# Patient Record
Sex: MALE | Race: WHITE | HISPANIC OR LATINO | Employment: FULL TIME | URBAN - METROPOLITAN AREA
[De-identification: names, ages, dates, MRNs, and addresses within clinical notes are randomized per-mention and may not be internally consistent; named-entity substitution may affect disease eponyms.]

---

## 2023-02-03 ENCOUNTER — APPOINTMENT (OUTPATIENT)
Dept: ULTRASOUND IMAGING | Facility: HOSPITAL | Age: 38
End: 2023-02-03
Attending: EMERGENCY MEDICINE

## 2023-02-03 ENCOUNTER — HOSPITAL ENCOUNTER (EMERGENCY)
Facility: HOSPITAL | Age: 38
Discharge: HOME | End: 2023-02-03
Attending: EMERGENCY MEDICINE | Admitting: EMERGENCY MEDICINE

## 2023-02-03 ENCOUNTER — APPOINTMENT (OUTPATIENT)
Dept: RADIOLOGY | Facility: HOSPITAL | Age: 38
End: 2023-02-03
Attending: EMERGENCY MEDICINE

## 2023-02-03 VITALS
TEMPERATURE: 98.7 F | RESPIRATION RATE: 16 BRPM | OXYGEN SATURATION: 99 % | SYSTOLIC BLOOD PRESSURE: 139 MMHG | DIASTOLIC BLOOD PRESSURE: 83 MMHG | HEART RATE: 93 BPM

## 2023-02-03 DIAGNOSIS — R10.13 ABDOMINAL PAIN, EPIGASTRIC: ICD-10-CM

## 2023-02-03 DIAGNOSIS — R74.8 ELEVATED LIVER ENZYMES: ICD-10-CM

## 2023-02-03 DIAGNOSIS — K76.0 HEPATIC STEATOSIS: ICD-10-CM

## 2023-02-03 LAB
ALBUMIN SERPL BCG-MCNC: 4.7 G/DL (ref 3.5–5.2)
ALBUMIN UR-MCNC: 30 MG/DL
ALP SERPL-CCNC: 116 U/L (ref 40–129)
ALT SERPL W P-5'-P-CCNC: 159 U/L (ref 10–50)
ANION GAP SERPL CALCULATED.3IONS-SCNC: 11 MMOL/L (ref 7–15)
APPEARANCE UR: CLEAR
AST SERPL W P-5'-P-CCNC: 69 U/L (ref 10–50)
BACTERIA #/AREA URNS HPF: ABNORMAL /HPF
BASOPHILS # BLD AUTO: 0.1 10E3/UL (ref 0–0.2)
BASOPHILS NFR BLD AUTO: 1 %
BILIRUB DIRECT SERPL-MCNC: <0.2 MG/DL (ref 0–0.3)
BILIRUB SERPL-MCNC: 0.5 MG/DL
BILIRUB UR QL STRIP: NEGATIVE
BUN SERPL-MCNC: 14.1 MG/DL (ref 6–20)
CALCIUM SERPL-MCNC: 9.5 MG/DL (ref 8.6–10)
CHLORIDE SERPL-SCNC: 103 MMOL/L (ref 98–107)
COLOR UR AUTO: ABNORMAL
CREAT SERPL-MCNC: 0.53 MG/DL (ref 0.67–1.17)
DEPRECATED HCO3 PLAS-SCNC: 26 MMOL/L (ref 22–29)
EOSINOPHIL # BLD AUTO: 0 10E3/UL (ref 0–0.7)
EOSINOPHIL NFR BLD AUTO: 0 %
ERYTHROCYTE [DISTWIDTH] IN BLOOD BY AUTOMATED COUNT: 12.2 % (ref 10–15)
GFR SERPL CREATININE-BSD FRML MDRD: >90 ML/MIN/1.73M2
GLUCOSE SERPL-MCNC: 108 MG/DL (ref 70–99)
GLUCOSE UR STRIP-MCNC: NEGATIVE MG/DL
HCT VFR BLD AUTO: 47.5 % (ref 40–53)
HGB BLD-MCNC: 16.1 G/DL (ref 13.3–17.7)
HGB UR QL STRIP: NEGATIVE
IMM GRANULOCYTES # BLD: 0 10E3/UL
IMM GRANULOCYTES NFR BLD: 0 %
KETONES UR STRIP-MCNC: NEGATIVE MG/DL
LEUKOCYTE ESTERASE UR QL STRIP: NEGATIVE
LIPASE SERPL-CCNC: 23 U/L (ref 13–60)
LYMPHOCYTES # BLD AUTO: 2.8 10E3/UL (ref 0.8–5.3)
LYMPHOCYTES NFR BLD AUTO: 27 %
MCH RBC QN AUTO: 31.5 PG (ref 26.5–33)
MCHC RBC AUTO-ENTMCNC: 33.9 G/DL (ref 31.5–36.5)
MCV RBC AUTO: 93 FL (ref 78–100)
MONOCYTES # BLD AUTO: 0.6 10E3/UL (ref 0–1.3)
MONOCYTES NFR BLD AUTO: 6 %
MUCOUS THREADS #/AREA URNS LPF: PRESENT /LPF
NEUTROPHILS # BLD AUTO: 7 10E3/UL (ref 1.6–8.3)
NEUTROPHILS NFR BLD AUTO: 66 %
NITRATE UR QL: NEGATIVE
NRBC # BLD AUTO: 0 10E3/UL
NRBC BLD AUTO-RTO: 0 /100
PH UR STRIP: 7.5 [PH] (ref 5–7)
PLATELET # BLD AUTO: 320 10E3/UL (ref 150–450)
POTASSIUM SERPL-SCNC: 4 MMOL/L (ref 3.4–5.3)
PROT SERPL-MCNC: 8.6 G/DL (ref 6.4–8.3)
RBC # BLD AUTO: 5.11 10E6/UL (ref 4.4–5.9)
RBC URINE: 1 /HPF
SODIUM SERPL-SCNC: 140 MMOL/L (ref 136–145)
SP GR UR STRIP: 1.02 (ref 1–1.03)
SQUAMOUS EPITHELIAL: <1 /HPF
TRANSITIONAL EPI: <1 /HPF
TROPONIN T SERPL HS-MCNC: <6 NG/L
UROBILINOGEN UR STRIP-MCNC: <2 MG/DL
WBC # BLD AUTO: 10.5 10E3/UL (ref 4–11)
WBC URINE: 9 /HPF

## 2023-02-03 PROCEDURE — 250N000013 HC RX MED GY IP 250 OP 250 PS 637: Performed by: EMERGENCY MEDICINE

## 2023-02-03 PROCEDURE — 80053 COMPREHEN METABOLIC PANEL: CPT | Performed by: EMERGENCY MEDICINE

## 2023-02-03 PROCEDURE — 82248 BILIRUBIN DIRECT: CPT | Performed by: EMERGENCY MEDICINE

## 2023-02-03 PROCEDURE — 250N000009 HC RX 250: Performed by: EMERGENCY MEDICINE

## 2023-02-03 PROCEDURE — 71046 X-RAY EXAM CHEST 2 VIEWS: CPT

## 2023-02-03 PROCEDURE — 81001 URINALYSIS AUTO W/SCOPE: CPT | Performed by: EMERGENCY MEDICINE

## 2023-02-03 PROCEDURE — 93005 ELECTROCARDIOGRAM TRACING: CPT | Performed by: EMERGENCY MEDICINE

## 2023-02-03 PROCEDURE — 99285 EMERGENCY DEPT VISIT HI MDM: CPT | Mod: 25

## 2023-02-03 PROCEDURE — 83690 ASSAY OF LIPASE: CPT | Performed by: EMERGENCY MEDICINE

## 2023-02-03 PROCEDURE — 85004 AUTOMATED DIFF WBC COUNT: CPT | Performed by: EMERGENCY MEDICINE

## 2023-02-03 PROCEDURE — 87086 URINE CULTURE/COLONY COUNT: CPT | Performed by: EMERGENCY MEDICINE

## 2023-02-03 PROCEDURE — 36415 COLL VENOUS BLD VENIPUNCTURE: CPT | Performed by: EMERGENCY MEDICINE

## 2023-02-03 PROCEDURE — 76705 ECHO EXAM OF ABDOMEN: CPT

## 2023-02-03 PROCEDURE — 84484 ASSAY OF TROPONIN QUANT: CPT | Performed by: EMERGENCY MEDICINE

## 2023-02-03 RX ORDER — OMEPRAZOLE 40 MG/1
40 CAPSULE, DELAYED RELEASE ORAL DAILY
Qty: 21 CAPSULE | Refills: 0 | Status: SHIPPED | OUTPATIENT
Start: 2023-02-03 | End: 2023-02-24

## 2023-02-03 RX ADMIN — LIDOCAINE HYDROCHLORIDE 30 ML: 20 SOLUTION ORAL; TOPICAL at 17:59

## 2023-02-03 ASSESSMENT — ACTIVITIES OF DAILY LIVING (ADL)
ADLS_ACUITY_SCORE: 35
ADLS_ACUITY_SCORE: 35

## 2023-02-03 NOTE — ED TRIAGE NOTES
Patient presents here with generalized abdominal pain that has occurred over the past week. No nausea, vomiting or diarrhea noted. He reports that the pain increases and decreased in intensity. At times, it is completely resolved. He notes that the pain often occurs when he is at work.

## 2023-02-03 NOTE — ED PROVIDER NOTES
Emergency Department Encounter     Evaluation Date & Time:   No admission date for patient encounter.    CHIEF COMPLAINT:  Abdominal Pain      Triage Note:Patient presents here with generalized abdominal pain that has occurred over the past week. No nausea, vomiting or diarrhea noted. He reports that the pain increases and decreased in intensity. At times, it is completely resolved. He notes that the pain often occurs when he is at work.            Impression and Plan       FINAL IMPRESSION:    ICD-10-CM    1. Abdominal pain, epigastric  R10.13 Adult GI  Referral - Consult Only      2. Elevated liver enzymes  R74.8 Adult GI  Referral - Consult Only      3. Hepatic steatosis  K76.0             ED COURSE & MEDICAL DECISION MAKIN:06 PM I met with the patient, obtained history, performed an initial exam, and discussed options and plan for diagnostics and treatment here in the ED. PPE worn including surgical mask, surgical gloves, surgical gown.  8:50 PM I reevaluated and updated the patient using the professional  service.    38 year old male, otherwise healthy, who presents for evaluation of burning epigastric abdominal pain that has been intermittent for the past week. He had previously been taking an unknown medication for this that would temporarily relieve his pain, but he has since stopped taking this medication. No associated N/V/D, urinary symptoms, fevers.     He reports occasional left-sided chest pressure associated with the abdominal pain. Denies shortness of breath.     On exam, he has RRR with clear breath sounds. Abdomen is soft with mild tenderness to palpation epigastrium; no peritoneal signs.    IV access established and blood sent for labs.    Considered cardiac etiology for which EKG was performed and demonstrated NSR with no ischemic changes.  Troponin negative (<6); a single, normal troponin is reassuring that symptoms are not secondary to ACS given that they  have been intermittent for ~1 week and I do not think serial troponin testing is indicated.    CXR performed and was negative.    Labs otherwise remarkable for no leukocytosis, anemia, electrolyte derangements or renal impairment.    Liver enzymes mildly elevated (AST 69, ) without laboratory evidence of biliary obstruction or pancreatitis.  RUQ ultrasound performed and demonstrated hepatic steatosis and was otherwise unremarkable.    Patient's pain significantly improved after GI cocktail.  I suspect gastritis vs PUD.    Abdomen is benign and I do not suspect perforated viscus, atypical appendicitis, colitis, diverticulitis, intra-abdominal abscess, aortic dissection or other surgical emergency and risks of CT imaging felt to outweigh benefit.    Patient discharged to home with follow-up with his primary care provider next week.  I also recommended GI follow-up for possible EGD and a referral was ordered.  Patient given a prescription for omeprazole.  Return precautions provided.  Patient stable throughout ED course.      At the conclusion of the encounter I discussed the results of all the tests and the disposition. The questions were answered. The patient acknowledged understanding and was agreeable with the care plan.    Medical Decision Making    History:    Supplemental history from: Documented in chart, if applicable    External Record(s) reviewed: Documented in chart, if applicable.    Work Up:    Chart documentation includes differential considered and any EKGs or imaging independently interpreted by provider, where specified.    In additional to work up documented, I considered the following work up: Documented in chart, if applicable. Considered CT abdomen / pelvis, however risks felt to outweigh benefit    Complicating factors:    Care impacted by chronic illness: N/A    Care affected by social determinants of health: N/A    Disposition considerations: Discharge. I prescribed additional  "prescription strength medication(s) as charted. I considered admission, but discharged the patient after share decision making conversation.      MEDICATIONS GIVEN IN THE EMERGENCY DEPARTMENT:  Medications   lidocaine (viscous) (XYLOCAINE) 2 % 15 mL, alum & mag hydroxide-simethicone (MAALOX) 15 mL GI Cocktail (30 mLs Oral Given 2/3/23 1759)       NEW PRESCRIPTIONS STARTED AT TODAY'S ED VISIT:  New Prescriptions    OMEPRAZOLE (PRILOSEC) 40 MG DR CAPSULE    Take 1 capsule (40 mg) by mouth daily for 21 days       HPI     History obtained from: Patient    Interpter: Yes (language line) - Upper sorbian      Dima Atkinson is a 38 year old male, otherwise healthy, who presents to this ED by walk in for evaluation of abdominal pain. The pain started one week ago and has been intermittent since onset. The pain is located in the epigastrium without radiation into his back. The pain is burning in nature and worse if he sits for too long. He had previously been taking an unknown medication for this that would temporarily relieve his pain, but he has since stopped taking this medication. He denies associated N/V/D, urinary symptoms, fevers.     He does report occasional left-sided chest pressure associated with the abdominal pain. Denies shortness of breath.     He also reports some occasional \"shaking\" as if \"I am low on certain vitamins\".     No history of abdominal surgeries.     REVIEW OF SYSTEMS:  All other systems reviewed and are negative.      Medical History     History reviewed. No pertinent past medical history.    History reviewed. No pertinent surgical history.    History reviewed. No pertinent family history.         omeprazole (PRILOSEC) 40 MG DR capsule        Physical Exam     First Vitals:  Patient Vitals for the past 24 hrs:   BP Temp Temp src Pulse Resp SpO2   02/03/23 1930 139/83 -- -- -- -- --   02/03/23 1815 (!) 142/78 -- -- -- -- --   02/03/23 1509 (!) 167/97 98.7  F (37.1  C) Oral 93 16 99 % "   02/03/23 1502 -- -- Oral -- -- --       PHYSICAL EXAM:   Physical Exam    GENERAL: Awake, alert.  In no acute distress.   HEENT: Normocephalic, atraumatic. Pupils equal, round and reactive. Conjunctiva normal.   NECK: No stridor.  PULMONARY: Symmetrical breath sounds without distress.  Lungs clear to auscultation bilaterally without wheezes, rhonchi or rales.  CARDIO: Regular rate and rhythm.  No significant murmur, rub or gallop.  Radial pulses strong and symmetrical.  ABDOMINAL: Abdomen soft, non-distended with mild tenderness to palpation epigastrium; no rebound tenderness or guarding.  No CVAT, BL.  EXTREMITIES: No lower extremity swelling or edema.      NEURO: Alert and oriented to person, place and time.  Cranial nerves grossly intact.  No focal motor deficit.  PSYCH: Normal mood and affect.  SKIN: No rashes.     Results     LAB:  All pertinent labs reviewed and interpreted  Labs Ordered and Resulted from Time of ED Arrival to Time of ED Departure   BASIC METABOLIC PANEL - Abnormal       Result Value    Sodium 140      Potassium 4.0      Chloride 103      Carbon Dioxide (CO2) 26      Anion Gap 11      Urea Nitrogen 14.1      Creatinine 0.53 (*)     Calcium 9.5      Glucose 108 (*)     GFR Estimate >90     HEPATIC FUNCTION PANEL - Abnormal    Protein Total 8.6 (*)     Albumin 4.7      Bilirubin Total 0.5      Alkaline Phosphatase 116      AST 69 (*)      (*)     Bilirubin Direct <0.20     ROUTINE UA WITH MICROSCOPIC REFLEX TO CULTURE - Abnormal    Color Urine Light Yellow      Appearance Urine Clear      Glucose Urine Negative      Bilirubin Urine Negative      Ketones Urine Negative      Specific Gravity Urine 1.024      Blood Urine Negative      pH Urine 7.5 (*)     Protein Albumin Urine 30 (*)     Urobilinogen Urine <2.0      Nitrite Urine Negative      Leukocyte Esterase Urine Negative      Bacteria Urine Few (*)     Mucus Urine Present (*)     RBC Urine 1      WBC Urine 9 (*)     Squamous  Epithelials Urine <1      Transitional Epithelials Urine <1     LIPASE - Normal    Lipase 23     TROPONIN T, HIGH SENSITIVITY - Normal    Troponin T, High Sensitivity <6     CBC WITH PLATELETS AND DIFFERENTIAL    WBC Count 10.5      RBC Count 5.11      Hemoglobin 16.1      Hematocrit 47.5      MCV 93      MCH 31.5      MCHC 33.9      RDW 12.2      Platelet Count 320      % Neutrophils 66      % Lymphocytes 27      % Monocytes 6      % Eosinophils 0      % Basophils 1      % Immature Granulocytes 0      NRBCs per 100 WBC 0      Absolute Neutrophils 7.0      Absolute Lymphocytes 2.8      Absolute Monocytes 0.6      Absolute Eosinophils 0.0      Absolute Basophils 0.1      Absolute Immature Granulocytes 0.0      Absolute NRBCs 0.0         RADIOLOGY:  Abdomen US, limited (RUQ only)   Final Result   IMPRESSION:   1.  Hepatic steatosis.   2.  Otherwise unremarkable right upper quadrant ultrasound. No biliary dilatation.            Chest XR,  PA & LAT   Final Result   IMPRESSION: Negative chest.        EC/3/2023, 16:40; NSR with rate of 77 bpm; normal intervals; normal conduction; no ST-T wave changes consistent with ACS or pericarditis; no previous EKG available for comparison    EKG independently reviewed and interpreted by Cindy Coy MD      I, Nolberto Tolliver, am serving as a scribe to document services personally performed by Cindy Coy MD based on my observation and the provider's statements to me. I, Cindy Coy MD attest that Nolberto Tolliver is acting in a scribe capacity, has observed my performance of the services and has documented them in accordance with my direction.    Cindy Coy MD  Emergency Medicine  [facility] EMERGENCY DEPARTMENT         Cindy Coy MD  23 [id number]

## 2023-02-04 NOTE — DISCHARGE INSTRUCTIONS
Please follow-up with your Primary Care Provider / Clinic this upcoming week for a recheck; call to arrange appointment.     Please have your doctor recheck your liver enzymes as they were elevated today.     I recommend a consultation with a gastroenterologist for possible upper endoscopy (EGD) for further evaluation of your pain. I placed a referral to the gastroenterologist - they should call you to arrange appointment.     Return to the ER for worsening symptoms, worsening abdominal pain, persistent nausea / vomiting, fever or other concerns.

## 2023-02-05 LAB — BACTERIA UR CULT: NO GROWTH

## 2023-02-08 NOTE — TELEPHONE ENCOUNTER
REFERRAL INFORMATION:    Referring Provider:  Cindy Coy    Referring Clinic:  [facility] - ED    Reason for Visit/Diagnosis: abdominal pain epigastric, elevated enzymes     FUTURE VISIT INFORMATION:    Appointment Date: 05-    Appointment Time: 1 pm     NOTES STATUS DETAILS   OFFICE NOTE from Referring Provider Internal Cindy Coy  02-   OFFICE NOTE from Other Specialist Internal Abdominal pain epigastric  Elevated liver enzymes   HOSPITAL DISCHARGE SUMMARY/  ED VISITS Internal 02-   OPERATIVE REPORT N/A    MEDICATION LIST Internal         ENDOSCOPY  N/A    COLONOSCOPY N/A    ERCP N/A    EUS N/A    STOOL TESTING N/A    PERTINENT LABS N/A    PATHOLOGY REPORTS (RELATED) Internal    IMAGING (CT, MRI, EGD, MRCP, Small Bowel Follow Through/SBT, MR/CT Enterography) Internal (US) 02-  (XR Chest) 02-

## 2023-02-08 NOTE — TELEPHONE ENCOUNTER
DIAGNOSIS: Abdominal pain, epigastric [R10.13] Elevated liver enzymes    Appt Date: 03.30.2023    NOTES STATUS DETAILS   OFFICE NOTE from referring provider Internal 02.03.2023 Cindy Coy MD   OFFICE NOTES from other specialists     DISCHARGE SUMMARY from hospital Internal 02.03.2023 Cindy Coy MD   MEDICATION LIST Internal    LIVER BIOSPY (IF APPLICABLE)      PATHOLOGY REPORTS      IMAGING     ENDOSCOPY (IF AVAILABLE)     COLONOSCOPY (IF AVAILABLE)     ULTRASOUND LIVER Internal 02.03.2023 US ABDOMEN LIMITED   CT OF ABDOMEN     MRI OF LIVER     FIBROSCAN, US ELASTOGRAPHY, FIBROSIS SCAN, MR ELASTOGRAPHY     LABS     HEPATIC PANEL (LIVER PANEL) Internal 02.03.2023   BASIC METABOLIC PANEL Internal 02.03.2023   COMPLETE METABOLIC PANEL Internal 02.03.2023   COMPLETE BLOOD COUNT (CBC)     INTERNATIONAL NORMALIZED RATIO (INR)     HEPATITIS C ANTIBODY     HEPATITIS C VIRAL LOAD/PCR     HEPATITIS C GENOTYPE     HEPATITIS B SURFACE ANTIGEN     HEPATITIS B SURFACE ANTIBODY     HEPATITIS B DNA QUANT LEVEL     HEPATITIS B CORE ANTIBODY

## 2023-02-10 ENCOUNTER — TELEPHONE (OUTPATIENT)
Dept: GASTROENTEROLOGY | Facility: CLINIC | Age: 38
End: 2023-02-10

## 2023-02-10 NOTE — TELEPHONE ENCOUNTER
Along with , Writer called Pt to get them scheduled with a sooner appointment. Did not reach Pt and was unable to leave a message.

## 2023-02-20 NOTE — TELEPHONE ENCOUNTER
Called and spoke with Pt. Pt is now rescheduled to see Debbie Slade on 3/9/2023 at 7am for an in-person clinic appointment.

## 2023-03-01 ENCOUNTER — TELEPHONE (OUTPATIENT)
Dept: GASTROENTEROLOGY | Facility: CLINIC | Age: 38
End: 2023-03-01

## 2023-03-01 NOTE — TELEPHONE ENCOUNTER
Called via  line to remind patient of their upcoming appointment with our GI clinic, on Thursday 3/9/2023 at 6:45AM with Debbie Slade. This appointment is scheduled as an in-person appt. Please arrive 15 minutes early to check in for your appointment. , if your appointment is virtual (video or telephone) you need to be in [place] for the visit. To reschedule or cancel patient to call [phone].    Madiha Marshall MA

## 2023-03-09 ENCOUNTER — OFFICE VISIT (OUTPATIENT)
Dept: GASTROENTEROLOGY | Facility: CLINIC | Age: 38
End: 2023-03-09
Attending: EMERGENCY MEDICINE

## 2023-03-09 VITALS — OXYGEN SATURATION: 96 % | DIASTOLIC BLOOD PRESSURE: 82 MMHG | SYSTOLIC BLOOD PRESSURE: 138 MMHG | HEART RATE: 72 BPM

## 2023-03-09 DIAGNOSIS — R74.8 ELEVATED LIVER ENZYMES: ICD-10-CM

## 2023-03-09 DIAGNOSIS — K21.9 GASTROESOPHAGEAL REFLUX DISEASE, UNSPECIFIED WHETHER ESOPHAGITIS PRESENT: Primary | ICD-10-CM

## 2023-03-09 DIAGNOSIS — R10.13 ABDOMINAL PAIN, EPIGASTRIC: ICD-10-CM

## 2023-03-09 PROCEDURE — 99204 OFFICE O/P NEW MOD 45 MIN: CPT | Performed by: DIETITIAN, REGISTERED

## 2023-03-09 RX ORDER — FAMOTIDINE 20 MG/1
20 TABLET, FILM COATED ORAL 2 TIMES DAILY PRN
Qty: 60 TABLET | Refills: 3 | Status: SHIPPED | OUTPATIENT
Start: 2023-03-09

## 2023-03-09 ASSESSMENT — PAIN SCALES - GENERAL: PAINLEVEL: EXTREME PAIN (8)

## 2023-03-09 NOTE — PATIENT INSTRUCTIONS
It was a pleasure taking care of you today.  I've included a brief summary of our discussion and care plan from today's visit below.  Please review this information with your primary care provider.  ______________________________________________________________________    My recommendations are summarized as follows:  -- Submit stool sample for h.pylori  -- Start famotidine (Pepcid) as needed up to twice per day  -- Take TUMS as needed  Lifestyle modifications for gastroesophageal reflux disease (GERD).     1. Change your eating habits.  -- It's best to eat several small meals instead of two or three large meals.  -- After you eat, wait 2 to 3 hours before you lie down. Late-night snacks aren't a good idea.  -- Chocolate, mint, and alcohol can make GERD worse. They relax the valve between the esophagus and the stomach.  -- Spicy foods, fatty foods, foods that have a lot of acid (like tomatoes and oranges), and coffee can make GERD symptoms worse in some people. If your symptoms are worse after you eat a certain food, you may want to stop eating that food to see if your symptoms get better.    2. Do not smoke or chew tobacco.    3. If you have GERD symptoms at night, raise the head of your bed 6 in. (15 cm) to 8 in. (20 cm) by putting the frame on blocks or placing a foam wedge under the head of your mattress. (Adding extra pillows does not work.)    4. Avoid or reduce pressure on your stomach. Don't wear tight clothing around your middle.    5. Lose weight if you need to. Losing just 5 to 10 pounds can help.    -- please see scheduling information provided below     Return to GI Clinic in 3 months to review your progress.    ______________________________________________________________________    How do I schedule labs, imaging studies, or procedures that were ordered in clinic today?     Labs: To schedule lab appointment at the [facility], use my chart or call [phone]. If you have a [facility]  lab closer to home where you are regularly seen you can give them a call.     Procedures: If a colonoscopy, upper endoscopy, breath test, esophageal manometry, or pH impedence was ordered today, our endoscopy team will call you to schedule this. If you have not heard from our endoscopy team within a week, please call [phone] to schedule.     Imaging Studies: If you were scheduled for a CT scan, X-ray, MRI, ultrasound, HIDA scan or other imaging study, please call [phone] to have this scheduled.     Referral: If a referral to another specialty was ordered, expect a phone call or follow instructions above. If you have not heard from anyone regarding your referral in a week, please call our clinic to check the status.     Who do I call with any questions after my visit?  Please be in touch if there are any further questions that arise following today's visit.  There are multiple ways to contact your gastroenterology care team.      During business hours, you may reach a Gastroenterology nurse at [phone]    To schedule or reschedule an appointment, please call [phone].     You can always send a secure message through Repunch.  Repunch messages are answered by your nurse or doctor typically within 24 hours.  Please allow extra time on weekends and holidays.      For urgent/emergent questions after business hours, you may reach the on-call GI Fellow by contacting the [facility]  at [phone].     How will I get the results of any tests ordered?    You will receive all of your results.  If you have signed up for [employer]unch, any tests ordered at your visit will be available to you after your provider reviews them.  Typically this takes 1-2 weeks.  If there are urgent results that require a change in your care plan, your provider or nurse will call you to discuss the next steps.      What is Repunch?  Repunch is a secure way for you to access all of your healthcare records from the  [facility].  It is a web based computer program, so you can sign on to it from any location.  It also allows you to send secure messages to your care team.  I recommend signing up for Easy[employer] access if you have not already done so and are comfortable with using a computer.      How to I schedule a follow-up visit?  If you did not schedule a follow-up visit today, please call [phone] to schedule a follow-up office visit.      Sincerely,    Debbie Slade PA-C  Division of Gastroenterology, Hepatology & Nutrition  [facility]

## 2023-03-09 NOTE — PROGRESS NOTES
GI CLINIC VISIT    CC/REFERRING MD:  Cindy Coy  REASON FOR CONSULTATION: epigastric pain    ASSESSMENT/PLAN:  #GERD  #Epigastric Pain  Suspect his symptoms are related to underlying GERD.  Other differential includes gastritis/esophagitis/PUD, functional dyspepsia, gastroparesis.  He has had improvement in symptoms over the last several weeks since ED visit with course of omeprazole.  No alarm features at this time.  Given that he is no longer taking omeprazole we will pursue H. pylori stool antigen testing.  After this is submitted we can complete another course of omeprazole if symptoms are not controlled with famotidine.  In the meantime he can take famotidine up to twice daily and Tums as needed.  Additionally discussed lifestyle modifications such as elevating head of bed, avoiding large meals, avoiding eating late at night, avoiding tight fitting clothing, ongoing avoidance of alcohol, and other dietary triggers.  If symptoms worsen or not controlled with omeprazole, recommend EGD for further evaluation.  Other future considerations include gastric emptying study.    Plan:  -- Submit stool sample for h.pylori  -- Start famotidine (Pepcid) as needed up to twice per day  -- Take TUMS as needed  -- After stool sample, we can prescribe another course of omeprazole if famotidine and TUMS are not controlling symptoms  -- Lifestyle modifications for gastroesophageal reflux disease (GERD).      Colorectal cancer screening: Colorectal cancer screening: Patient has no personal or family history of colorectal cancer.  Recommend starting surveillance screening at the age of 45 unless symptomatic sooner.      RTC 3 months    Thank you for this consultation.  It was a pleasure to participate in the care of this patient; please contact us with any further questions.     53 Minutes was spent on the date of the encounter during chart review, history and exam, documentation, and further activities as noted        Debbie Slade PA-C, RD  Division of Gastroenterology, Hepatology & Nutrition  [facility]        HPI  Dima Atkinson is a 38 year old male with no significant past medical history who presents for evaluation of epigastric pain. They are new to the [facility] GI clinic and this is my first encounter with the patient.     He was seen in the ED 2/3/23 for one week of epigastric burning pain. Labs notable for slightly elevated LFTs,  (AST 69, ) otherwise normal including lipase. RUQ ultrasound performed and demonstrated hepatic steatosis and was otherwise unremarkable. He also reported occasional left-sided chest pressure associated with the abdominal pain.  EKG and troponin were unremarkable. He was given GI cocktail with relief and referred to luminal GI for possible EGD and hepatology.     Today Mr. Robert Atkinson reports that epigastric pain is actually quite improved since ED visit.  He completed a 21-day course of omeprazole after.  Not currently taking any medications.    Does continue to have intermittent epigastric and substernal burning with eating.  Will occasionally experience acid brash and taste of acid in mouth when more severe, this has been less frequent recently.  He denies regurgitation, cough, throat clearing, globus sensation.  Notes that spicy food and Coca-Cola make it worse.  Denies dysphagia or odynophagia.    He does note that he sometimes feels full quickly.  Occasionally experiences nausea in the morning after waking.  No vomiting. No bloating.  No diarrhea or constipation. No change in bowel pattern - 4 stools per day (BSC 3-4). No straining. Feels empty after. No hematochezia or melena.     No personal or family history of h.pylori. No PUD. No personal or family history of esophageal or stomach cancer.     No significant NSAID use. Was drinking ~5 drinks per day up until time of sx onset, stopped after which helped with sx. No tobacco use.      ROS:     No fevers or chills  No weight loss  No blurry vision, double vision or change in vision  No sore throat  No lymphadenopathy  No headache, paraesthesias, or weakness in a limb  No shortness of breath or wheezing  No chest pain or pressure  No arthralgias or myalgias  No rashes or skin changes  No odynophagia or dysphagia  No BRBPR, hematochezia, melena  No dysuria, frequency or urgency  No hot/cold intolerance or polyria  No anxiety or depression      PROBLEM LIST  There are no problems to display for this patient.      PERTINENT PAST MEDICAL HISTORY:  No past medical history on file.    PREVIOUS SURGERIES:  No past surgical history on file.    PREVIOUS ENDOSCOPY:  no    ALLERGIES:   No Known Allergies    PERTINENT MEDICATIONS:  No current outpatient medications on file.  No other NSAID/anticoagulation reported by patient.  No other OTC/herbal/supplements reported by patient.  Vitamins for stress - for being tired    SOCIAL HISTORY:  Tobacco: no  Alcohol: no, was drinking 5 drinks per day, quit when pain started. Sometimes made pain worse.  Drugs Use: no    Social History     Socioeconomic History     Marital status: Single     Spouse name: Not on file     Number of children: Not on file     Years of education: Not on file     Highest education level: Not on file   Occupational History     Not on file   Tobacco Use     Smoking status: Not on file     Smokeless tobacco: Not on file   Substance and Sexual Activity     Alcohol use: Not on file     Drug use: Not on file     Sexual activity: Not on file   Other Topics Concern     Not on file   Social History Narrative     Not on file     Social Determinants of Health     Financial Resource Strain: Not on file   Food Insecurity: Not on file   Transportation Needs: Not on file   Physical Activity: Not on file   Stress: Not on file   Social Connections: Not on file   Intimate Partner Violence: Not on file   Housing Stability: Not on file       FAMILY HISTORY:  FH of CRC:  no  FH of IBD: no  FH of esophageal cancer: no  FH of stomach cancer: no    No family history on file.    Past/family/social history reviewed and no changes    PHYSICAL EXAMINATION:  Constitutional: AAOx3, cooperative, pleasant, not dyspneic/diaphoretic, no acute distress  Vitals reviewed: There were no vitals taken for this visit.  Wt:   Wt Readings from Last 2 Encounters:   No data found for Wt      Eyes: Sclera anicteric/injected  Ears/nose/mouth/throat: Normal oropharynx without ulcers or exudate, mucus membranes moist, hearing intact  Neck: supple, thyroid normal size  CV: No edema  Respiratory: Unlabored breathing  Lymph: No axillary, submandibular, supraclavicular or inguinal lymphadenopathy  Abd: Nondistended, +bs, no hepatosplenomegaly, nontender, no peritoneal signs  Skin: warm, perfused, no jaundice  Psych: Normal affect  MSK: Normal gait      PERTINENT STUDIES:  No results found for any previous visit.     Abdominal US 2/3/23  IMPRESSION:  1.  Hepatic steatosis.  2.  Otherwise unremarkable right upper quadrant ultrasound. No biliary dilatation.

## 2023-03-09 NOTE — NURSING NOTE
Chief Complaint   Patient presents with     New Patient       Vitals:    03/09/23 0709   BP: 138/82   Pulse: 72   SpO2: 96%   Weight: (P) 90.7 kg (200 lb)       There is no height or weight on file to calculate BMI.    [employer]

## 2023-03-09 NOTE — LETTER
Date:April 3, 2023      Provider requested that no letter be sent. Do not send.       [facility]

## 2023-03-30 ENCOUNTER — PRE VISIT (OUTPATIENT)
Dept: GASTROENTEROLOGY | Facility: CLINIC | Age: 38
End: 2023-03-30

## 2023-03-30 ENCOUNTER — PRE VISIT (OUTPATIENT)
Dept: NEPHROLOGY | Facility: CLINIC | Age: 38
End: 2023-03-30

## 2023-04-26 ENCOUNTER — APPOINTMENT (OUTPATIENT)
Dept: INTERPRETER SERVICES | Facility: CLINIC | Age: 38
End: 2023-04-26

## 2023-04-26 ENCOUNTER — TELEPHONE (OUTPATIENT)
Dept: GASTROENTEROLOGY | Facility: CLINIC | Age: 38
End: 2023-04-26

## 2023-05-23 ENCOUNTER — PRE VISIT (OUTPATIENT)
Dept: GASTROENTEROLOGY | Facility: CLINIC | Age: 38
End: 2023-05-23

## 2025-04-18 ENCOUNTER — HOSPITAL ENCOUNTER (EMERGENCY)
Facility: HOSPITAL | Age: 40
Discharge: HOME | End: 2025-04-18
Attending: EMERGENCY MEDICINE | Admitting: EMERGENCY MEDICINE

## 2025-04-18 ENCOUNTER — APPOINTMENT (OUTPATIENT)
Dept: RADIOLOGY | Facility: HOSPITAL | Age: 40
End: 2025-04-18
Attending: EMERGENCY MEDICINE

## 2025-04-18 VITALS
RESPIRATION RATE: 18 BRPM | TEMPERATURE: 97.2 F | SYSTOLIC BLOOD PRESSURE: 145 MMHG | DIASTOLIC BLOOD PRESSURE: 91 MMHG | HEART RATE: 75 BPM | OXYGEN SATURATION: 98 % | WEIGHT: 200 LBS

## 2025-04-18 DIAGNOSIS — R07.9 CHEST PAIN, UNSPECIFIED TYPE: ICD-10-CM

## 2025-04-18 DIAGNOSIS — Z86.59 HISTORY OF ANXIETY: ICD-10-CM

## 2025-04-18 LAB
ANION GAP SERPL CALCULATED.3IONS-SCNC: 11 MMOL/L (ref 7–15)
BASOPHILS # BLD AUTO: 0 10E3/UL (ref 0–0.2)
BASOPHILS NFR BLD AUTO: 1 %
BUN SERPL-MCNC: 18.5 MG/DL (ref 6–20)
CALCIUM SERPL-MCNC: 9.6 MG/DL (ref 8.8–10.4)
CHLORIDE SERPL-SCNC: 103 MMOL/L (ref 98–107)
CREAT SERPL-MCNC: 0.63 MG/DL (ref 0.67–1.17)
D DIMER PPP FEU-MCNC: <0.27 UG/ML FEU (ref 0–0.5)
EGFRCR SERPLBLD CKD-EPI 2021: >90 ML/MIN/1.73M2
EOSINOPHIL # BLD AUTO: 0.1 10E3/UL (ref 0–0.7)
EOSINOPHIL NFR BLD AUTO: 1 %
ERYTHROCYTE [DISTWIDTH] IN BLOOD BY AUTOMATED COUNT: 12.4 % (ref 10–15)
GLUCOSE SERPL-MCNC: 102 MG/DL (ref 70–99)
HCO3 SERPL-SCNC: 25 MMOL/L (ref 22–29)
HCT VFR BLD AUTO: 41.3 % (ref 40–53)
HGB BLD-MCNC: 14.5 G/DL (ref 13.3–17.7)
HOLD SPECIMEN: NORMAL
HOLD SPECIMEN: NORMAL
IMM GRANULOCYTES # BLD: 0 10E3/UL
IMM GRANULOCYTES NFR BLD: 0 %
LYMPHOCYTES # BLD AUTO: 2.5 10E3/UL (ref 0.8–5.3)
LYMPHOCYTES NFR BLD AUTO: 30 %
MCH RBC QN AUTO: 30.7 PG (ref 26.5–33)
MCHC RBC AUTO-ENTMCNC: 35.1 G/DL (ref 31.5–36.5)
MCV RBC AUTO: 87 FL (ref 78–100)
MONOCYTES # BLD AUTO: 0.5 10E3/UL (ref 0–1.3)
MONOCYTES NFR BLD AUTO: 6 %
NEUTROPHILS # BLD AUTO: 5.1 10E3/UL (ref 1.6–8.3)
NEUTROPHILS NFR BLD AUTO: 61 %
NRBC # BLD AUTO: 0 10E3/UL
NRBC BLD AUTO-RTO: 0 /100
PLATELET # BLD AUTO: 262 10E3/UL (ref 150–450)
POTASSIUM SERPL-SCNC: 4 MMOL/L (ref 3.4–5.3)
RBC # BLD AUTO: 4.73 10E6/UL (ref 4.4–5.9)
SODIUM SERPL-SCNC: 139 MMOL/L (ref 135–145)
TROPONIN T SERPL HS-MCNC: <6 NG/L
WBC # BLD AUTO: 8.3 10E3/UL (ref 4–11)

## 2025-04-18 PROCEDURE — 84484 ASSAY OF TROPONIN QUANT: CPT | Performed by: STUDENT IN AN ORGANIZED HEALTH CARE EDUCATION/TRAINING PROGRAM

## 2025-04-18 PROCEDURE — 85379 FIBRIN DEGRADATION QUANT: CPT | Performed by: EMERGENCY MEDICINE

## 2025-04-18 PROCEDURE — 250N000013 HC RX MED GY IP 250 OP 250 PS 637: Performed by: EMERGENCY MEDICINE

## 2025-04-18 PROCEDURE — 85004 AUTOMATED DIFF WBC COUNT: CPT | Performed by: STUDENT IN AN ORGANIZED HEALTH CARE EDUCATION/TRAINING PROGRAM

## 2025-04-18 PROCEDURE — 82310 ASSAY OF CALCIUM: CPT | Performed by: STUDENT IN AN ORGANIZED HEALTH CARE EDUCATION/TRAINING PROGRAM

## 2025-04-18 PROCEDURE — 71046 X-RAY EXAM CHEST 2 VIEWS: CPT

## 2025-04-18 PROCEDURE — 99285 EMERGENCY DEPT VISIT HI MDM: CPT | Mod: 25 | Performed by: EMERGENCY MEDICINE

## 2025-04-18 PROCEDURE — 93005 ELECTROCARDIOGRAM TRACING: CPT | Performed by: EMERGENCY MEDICINE

## 2025-04-18 PROCEDURE — 250N000011 HC RX IP 250 OP 636: Mod: JZ | Performed by: EMERGENCY MEDICINE

## 2025-04-18 PROCEDURE — 96374 THER/PROPH/DIAG INJ IV PUSH: CPT | Performed by: EMERGENCY MEDICINE

## 2025-04-18 PROCEDURE — 80048 BASIC METABOLIC PNL TOTAL CA: CPT | Performed by: STUDENT IN AN ORGANIZED HEALTH CARE EDUCATION/TRAINING PROGRAM

## 2025-04-18 PROCEDURE — 36415 COLL VENOUS BLD VENIPUNCTURE: CPT | Performed by: STUDENT IN AN ORGANIZED HEALTH CARE EDUCATION/TRAINING PROGRAM

## 2025-04-18 RX ORDER — HYDROXYZINE PAMOATE 50 MG/1
50 CAPSULE ORAL 3 TIMES DAILY PRN
Qty: 60 CAPSULE | Refills: 1 | Status: SHIPPED | OUTPATIENT
Start: 2025-04-18

## 2025-04-18 RX ORDER — KETOROLAC TROMETHAMINE 15 MG/ML
15 INJECTION, SOLUTION INTRAMUSCULAR; INTRAVENOUS ONCE
Status: COMPLETED | OUTPATIENT
Start: 2025-04-18 | End: 2025-04-18

## 2025-04-18 RX ORDER — OMEPRAZOLE 20 MG/1
20 CAPSULE, DELAYED RELEASE ORAL DAILY
Qty: 30 CAPSULE | Refills: 0 | Status: SHIPPED | OUTPATIENT
Start: 2025-04-18 | End: 2025-05-18

## 2025-04-18 RX ORDER — MAGNESIUM HYDROXIDE/ALUMINUM HYDROXICE/SIMETHICONE 120; 1200; 1200 MG/30ML; MG/30ML; MG/30ML
30 SUSPENSION ORAL ONCE
Status: COMPLETED | OUTPATIENT
Start: 2025-04-18 | End: 2025-04-18

## 2025-04-18 RX ADMIN — ALUMINUM HYDROXIDE, MAGNESIUM HYDROXIDE, AND SIMETHICONE 30 ML: 200; 200; 20 SUSPENSION ORAL at 19:53

## 2025-04-18 RX ADMIN — KETOROLAC TROMETHAMINE 15 MG: 15 INJECTION, SOLUTION INTRAMUSCULAR; INTRAVENOUS at 19:54

## 2025-04-18 ASSESSMENT — ACTIVITIES OF DAILY LIVING (ADL)
ADLS_ACUITY_SCORE: 41
ADLS_ACUITY_SCORE: 41

## 2025-04-18 ASSESSMENT — COLUMBIA-SUICIDE SEVERITY RATING SCALE - C-SSRS
2. HAVE YOU ACTUALLY HAD ANY THOUGHTS OF KILLING YOURSELF IN THE PAST MONTH?: NO
1. IN THE PAST MONTH, HAVE YOU WISHED YOU WERE DEAD OR WISHED YOU COULD GO TO SLEEP AND NOT WAKE UP?: NO
6. HAVE YOU EVER DONE ANYTHING, STARTED TO DO ANYTHING, OR PREPARED TO DO ANYTHING TO END YOUR LIFE?: NO

## 2025-04-18 NOTE — ED PROVIDER NOTES
EMERGENCY DEPARTMENT NOTE     Name: Dima Atkinson    Age/Sex: 40 year old male   MRN: [id number]   Evaluation Date & Time:  No admission date for patient encounter.    PCP:    No Ref-Primary, Physician   ED Provider: Jean Sanchez D.O.       CHIEF COMPLAINT    No chief complaint on file.     HISTORY OF PRESENT ILLNESS BRIEF   Dima Atkinson is a 40 year old year old male with no relevant past history, who presents to the ED  for evaluation of chest pain.      DIAGNOSIS & DISPOSITION/MEDICAL DECISION MAKING   No diagnosis found.    EMERGENCY DEPARTMENT COURSE   6:17 PM I met with the patient to gather history and to perform my initial exam.  We discussed treatment options and the plan for care while in the Emergency Department.  7:59 PM I went to update the patient that they will be discharged.  Triage vital signs:     BP Temp Pulse Resp SpO2 Weight   17:46 140/85 97.2  F (36.2  C) 82 18 99 % 90.7 kg (200 lb)     Differential diagnosis considered included but not limited to:  Emergent causes of chest pain considered included but not limited to ACS, myocarditis/pericarditis, pulmonary embolism, thoracic aortic dissection, pneumothorax.    MDM:   Acute Coronary Syndrome considered  No acute ischemic changes noted on ECG  Negative troponin  HEART Score for Major Cardiac Events from Document [email or web address]  on 4/18/2025  RESULT SUMMARY:  0 points  Low Score (0-3 points)  Risk of MACE of 0.9-1.7%.  INPUTS:  History --> 0 = Slightly suspicious  EKG --> 0 = Normal  Age --> 0 = <45  Risk factors --> 0 = No known risk factors  Initial troponin --> 0 = <=normal limit  Pericarditis considered  ECG and History do not support diagnosis of pericarditis  Pulmonary embolism considered  PERC negative  Low pretest clinical probability for thromboembolic disease.D dimer non elevated and felt to exclude Pulmonary Embolism  Further evaluation with CTA not pursued  Thoracic Aortic Dissection considered  Presenting symptoms  uncharacteristic of Thoracic Aortic Dissection  CXR with no mediastinal widening  No cardiac murmur,peripheral pulses strong,equal,symmetric  Given current presentation Thoracic Aortic Dissection felt to be unlikely and further evaluation with CTA not pursued  Pneumothorax considered  No evidence of Pneumothorax on CXR  Pneumonia considered  Afebrile,no infiltrate noted on CXR  Congestive Heart Failure  History,Physical exam,CXR do not suggest pulmonary edema,CHF  Referred  intra abdominal Process  Noreported  associated abdominal pain.Physical Exam without tenderness    Will place the patient on omeprazole for possible GI etiology GERD.  Patient reports possible depressive symptoms of excessive fatigue.  At previous clinical clinic had negative TSH.  Patient is not anemic or other obvious cause.  Does admit to episodes of anxiety and will also prescribe hydroxyzine to use as needed.  Patient will be discharged.  If recurrent chest pain not improved with Tylenol ibuprofen or new symptoms including shortness of breath will return to the emergency department.  Patient will otherwise follow-up with his primary care clinic early next week for reevaluation and further treatment.  Discharge Vital Signs:     BP Temp Pulse Resp SpO2 Weight   19:55 158/94 -- 80 18 98 % --     PROCEDURES:   None  Diagnostic studies:  No orders to display     Labs Ordered and Resulted from Time of ED Arrival to Time of ED Departure - No data to display  ED INTERVENTIONS   Medications - No data to display  TOTAL CRITICAL CARE TIME (EXCLUDING PROCEDURES): Not applicable      DISCHARGE MEDICATIONS        Review of your medicines        UNREVIEWED medicines. Ask your doctor about these medicines        Dose / Directions   famotidine 20 MG tablet  Commonly known as: PEPCID  Used for: Abdominal pain, epigastric      Dose: 20 mg  Take 1 tablet (20 mg) by mouth 2 times daily as needed (for burning stomach pain)  Quantity: 60 tablet  Refills: 3         "    DISPOSITION: hOME      At the conclusion of the encounter I discussed the results of all of the tests and the disposition. The questions were answered. The patient or family acknowledged understanding and was agreeable with the care plan.        HISTORY OF PRESENT ILLNESS   Dima Atkinson is a 40 year old year old male with no relevant past history, who presents to the ED  for evaluation of chest pain.    Per patient via a , today (04/18/2025) around one hour before being seen he developed a burning sensation in his chest, he felt like he was going to pass out, and he was shaking. However, here in the ED he just has some burning in the left side of his chest. It was noted that 2-3 months ago he had pneumonia. He had also been told last time he was here (chart shows 03/03/2025) that he has a bad liver. Sometimes he has a hard time breathing deeply and gets abnormally \"tired/sleepy\". At times he gets anxiety.     He has had no recent cold, sore throat, vomiting, diarrhea, black/tarry stools, leg swelling/pain, travel, or cough. In the past he has never had pain/symptoms like he experienced today. He has no history of heart burn. He did not mention taking anything for his symptoms today. No daily medications were mentioned.    Chart review:  Per Chart Review, the patient was seen on 02/27/2025 at [place] ED for evaluation of an influenza like illness. Chest x-ray revealed left/right lower lobe pneumonia. Thus, he was discharged with doxycycline.     INFORMATION SOURCE AND LIMITATIONS    History/Exam limitations: None  Patient information was obtained from: patient  Use of : Yes (Phone) - Language: Czech        REVIEW OF SYSTEMS:   All other systems reviewed and are negative except as noted above in HPI.    PATIENT HISTORY   No past medical history on file.  There is no problem list on file for this patient.    No past surgical history on file.    No Known " Allergies    OUTPATIENT MEDICATIONS     New Prescriptions    No medications on file    There were no vitals filed for this visit.    Physical Exam   Constitutional: Oriented to person, place, and time. Appears well-developed and well-nourished.   HEENT:    Head: Atraumatic.   Neck: Normal range of motion. Neck supple.   Cardiovascular: Normal rate, regular rhythm and normal heart sounds.    Pulmonary/Chest: Normal effort  and breath sounds normal.   Abdominal: Soft. Bowel sounds are normal.   Musculoskeletal: Normal range of motion.   Neurological: Alert and oriented to person, place, and time. Normal strength. No sensory deficit. No cranial nerve deficit.  Skin: Skin is warm and dry.   Psychiatric: Normal mood and affect. Behavior is normal. Thought content normal.     DIAGNOSTICS    LABORATORY FINDINGS (REVIEWED AND INTERPRETED):  Labs Ordered and Resulted from Time of ED Arrival to Time of ED Departure - No data to display      IMAGING (REVIEWED AND INTERPRETED):  No orders to display         ECG (REVIEWED AND INTERPRETED):   ECG:   Performed at: 5:33 PM on 04/18/2025  HR:  83 bpm  Rhythm: Sinus  Axis: 45, 31, 14  QRS duration: 94 ms  QTC: 364/427 ms  ST changes: No ST segment elevation or depression, no T wave inversion,No Q wave  Interpretation: Sinus rhythm with nonspecific intraventricular conduction delay.  Compared to most recent ECG from: 02/03/2023 at 4:40 PM no acute change    I have reviewed the patient's ECG, with comments made as listed above. Please see scanned image for full interpretation.     Billing Documentation    I obtained additional history from these independent historians: N/A    I reviewed these outside records: Visit with [place] ED on 02/27/2025 and visit with [facility] on 03/31/2025    [place] ED visit from 02/27/2025:  Per Chart Review, the patient was seen on 02/27/2025 at [place] ED for evaluation of an influenza like illness. Chest x-ray revealed left/right lower lobe  pneumonia. Thus, he was discharged with doxycycline.    [facility] visit from 03/31/2025:  Stanley Ch is a 40 year old male here for Shortness of Breath (Shortness of breath was diagnosed with pneumonia 2/27/Gets dizzy and tired )  He was seen in Feb in ED  Was diagnosed with pneumonia  Took doxycyxline  Here with int, atypical ongoing left sided chest pain  Ths pain is high, left sided and adjacent to the sternum  He an push on the spot that hurts  Once the pain witll start, it can last about 5 minuts  Can start with acvtity but also deep breath  Worse with doing twisitng lifting.     He has alos be noting icnreased fatigue compared to baseline  Wife also worries    Has also some neck pain and occn lower back pain    Documentation Reviewed by Any User: Allergies      No current outpatient medications on file.     Review of Systems   All other systems reviewed and are negative.    Objective     BP (!) 144/90 (Right Arm, Sitting, Regular Adult)  Pulse 65  Temp 98.2  F (36.8  C)  Wt 200 lb 6.4 oz (90.9 kg)  SpO2 97%    Physical Exam    Constitutional:   General: He is not in acute distress.  Appearance: He is normal weight.   Cardiovascular:   Rate and Rhythm: Normal rate.   Pulses: Normal pulses.   Heart sounds: No murmur heard.  Pulmonary:   Effort: Pulmonary effort is normal. No prolonged expiration, respiratory distress or retractions.   Breath sounds: Normal breath sounds.   Chest:   Chest wall: Tenderness present. No mass or lacerations.     Comments: Tender in the indicated location  Abdominal:   General: Abdomen is flat. Bowel sounds are normal. There is no distension.   Skin:  General: Skin is warm.   Capillary Refill: Capillary refill takes less than 2 seconds.   Coloration: Skin is not jaundiced.   Neurological:   General: No focal deficit present.   Mental Status: He is alert.   Cranial Nerves: No cranial nerve deficit.     Assessment and Plan     1. Costochondritis  (Primary)  Comments:  Mild reprodcuible. Recent ED visit with xray and EKG. Not cardiac today, had recent infection. Dicsussed heide course and symptom management  2. Fatigue, unspecified type  Comments:  Check CBC and TSH, suspect multifactorial  Other orders  - naproxen (NAPROSYN) 500 mg tablet; Take 1 Tablet by mouth 2 (two) times daily as needed for other reason (pain or mild fever), Disp-30 Tablet, R-3Label in patient's preferred language:Lebanese. e-Prescribing Dispense: 30 Tablet; Refill: 3  - diclofenac sodium (VOLTAREN) 1 % gel; Apply 2 g topically 2 (two) times daily as needed for stefanie-Prescribing, topical, Disp-100 g, R-2 Dispense: 100 g; Refill: 2     I noted these abnormal vital signs / labs:  na    Monitor Strip Interpretation:  Sinus rhythm  12-Lead ECG Interpretation:  Sinus rhythm  I independently reviewed the following diagnostic studies:  Chest x-ray: No acute process mediastinum appears normal  I spoke to the following clinicians regard the patients care:  N/A  My disposition decision is based on the following reasons:    Discharge: I considered admission but discharged the patient after current workup and patient's clinical course in the emergency department.  Prescription medications prescribed included omeprazole and hydroxyzine    Compliance Documentation  MIPS Documentation Medical Decision Making  I reviewed the EMR: Outpatient Record: Document above  I considered additional work up, including CTA chest, but deferred secondary to low suspicion for thoracic aortic dissection or pulmonary embolism  I independently interpreted the chest x-ray and note no acute process. See radiology report for final interpretation.  Discharge. I prescribed additional prescription strength medication(s) as charted. I considered admission, but discharged the patient after share decision making conversation.    MIPS (CTPE, Dental pain, Smith, Sinusitis, Asthma/COPD, Head Trauma): Not Applicable    SEPSIS:  None        At the time of my evaluation, I do not feel the patient s symptoms are caused by sepsis    I, Burke Qiu, am serving as a scribe to document services personally performed by Jean Sanchez DO, based on my observations and the provider's statements to me.  I, Jean Sanchez DO, attest that Burke Qiu is acting in a scribe capacity, has observed my performance of the services and has documented them in accordance with my direction.       Jean Sanchez D.O.  EMERGENCY MEDICINE   04/18/25  [facility] EMERGENCY DEPARTMENT  [address] [place] [id number]  [phone]  Dept: [phone]      Jean Sanchez DO  04/18/25 2054

## 2025-04-18 NOTE — ED TRIAGE NOTES
"    Patient arrives with complaints of left sided chest burning that started about one month ago.  C/o intermittent SOB with this.  Today at work, patient stated he had an increase in chest pressure and has a sensation of some palpitations and felt very shakey.  Denies any daily medications.  Reports this happened to him a year ago and was seen without diagnosis.  Recently seen in the clinic 1 month ago and given medications \"for inflammation.\"  "

## 2025-04-19 NOTE — ED NOTES
"Pt reports \"burning pain \"in chest x months. With pain increasing over the past 3 hrs. He also noted \"felt like he was going to pass out, hand were shaky\"  "

## 2025-04-19 NOTE — DISCHARGE INSTRUCTIONS
Start omeprazole daily.  For symptoms of anxiety may use hydroxyzine every 6 hours as needed.  Follow-up with your primary care physician at [facility] early next week.  Take ibuprofen 400 mg every 8 hours and Tylenol 650 m g every 6 hours for pain management.  If you have recurrent chest pain not improved with these medications or new symptoms including shortness of breath return to the emergency department.

## 2025-04-22 LAB
ATRIAL RATE - MUSE: 83 BPM
DIASTOLIC BLOOD PRESSURE - MUSE: NORMAL MMHG
INTERPRETATION ECG - MUSE: NORMAL
P AXIS - MUSE: 45 DEGREES
PR INTERVAL - MUSE: 148 MS
QRS DURATION - MUSE: 94 MS
QT - MUSE: 364 MS
QTC - MUSE: 427 MS
R AXIS - MUSE: 31 DEGREES
SYSTOLIC BLOOD PRESSURE - MUSE: NORMAL MMHG
T AXIS - MUSE: 14 DEGREES
VENTRICULAR RATE- MUSE: 83 BPM

## 2025-07-18 ENCOUNTER — HOSPITAL ENCOUNTER (EMERGENCY)
Facility: HOSPITAL | Age: 40
Discharge: HOME | End: 2025-07-18
Admitting: PHYSICIAN ASSISTANT
Payer: MEDICAID

## 2025-07-18 VITALS
DIASTOLIC BLOOD PRESSURE: 90 MMHG | SYSTOLIC BLOOD PRESSURE: 169 MMHG | OXYGEN SATURATION: 98 % | TEMPERATURE: 97 F | HEART RATE: 81 BPM | RESPIRATION RATE: 20 BRPM | WEIGHT: 200.9 LBS

## 2025-07-18 DIAGNOSIS — G44.209 ACUTE NON INTRACTABLE TENSION-TYPE HEADACHE: ICD-10-CM

## 2025-07-18 DIAGNOSIS — R42 DIZZINESS: ICD-10-CM

## 2025-07-18 PROCEDURE — 99283 EMERGENCY DEPT VISIT LOW MDM: CPT

## 2025-07-18 PROCEDURE — 99282 EMERGENCY DEPT VISIT SF MDM: CPT | Performed by: PHYSICIAN ASSISTANT

## 2025-07-18 RX ORDER — MECLIZINE HCL 12.5 MG 12.5 MG/1
12.5 TABLET ORAL 4 TIMES DAILY PRN
Qty: 30 TABLET | Refills: 0 | Status: SHIPPED | OUTPATIENT
Start: 2025-07-18

## 2025-07-18 RX ORDER — KETOROLAC TROMETHAMINE 15 MG/ML
15 INJECTION, SOLUTION INTRAMUSCULAR; INTRAVENOUS ONCE
Status: DISCONTINUED | OUTPATIENT
Start: 2025-07-18 | End: 2025-07-18 | Stop reason: HOSPADM

## 2025-07-18 RX ORDER — MECLIZINE HYDROCHLORIDE 25 MG/1
25 TABLET ORAL ONCE
Status: DISCONTINUED | OUTPATIENT
Start: 2025-07-18 | End: 2025-07-18 | Stop reason: HOSPADM

## 2025-07-18 ASSESSMENT — COLUMBIA-SUICIDE SEVERITY RATING SCALE - C-SSRS
6. HAVE YOU EVER DONE ANYTHING, STARTED TO DO ANYTHING, OR PREPARED TO DO ANYTHING TO END YOUR LIFE?: NO
2. HAVE YOU ACTUALLY HAD ANY THOUGHTS OF KILLING YOURSELF IN THE PAST MONTH?: NO
1. IN THE PAST MONTH, HAVE YOU WISHED YOU WERE DEAD OR WISHED YOU COULD GO TO SLEEP AND NOT WAKE UP?: NO

## 2025-07-18 ASSESSMENT — ACTIVITIES OF DAILY LIVING (ADL)
ADLS_ACUITY_SCORE: 41
ADLS_ACUITY_SCORE: 41

## 2025-07-18 NOTE — ED TRIAGE NOTES
HA to back of his head since yesterday. Took headache relief pill. Did not help. Some nausea but no vomiting. Some dizziness. No visual changes     Triage Assessment (Adult)       Row Name 07/18/25 1534          Triage Assessment    Airway WDL WDL

## 2025-07-18 NOTE — ED PROVIDER NOTES
EMERGENCY DEPARTMENT ENCOUNTER      NAME: Dima Atkinson  AGE: 40 year old male  YOB: 1985  MRN: [id number]  EVALUATION DATE & TIME: No admission date for patient encounter.    PCP: No Ref-Primary, Physician    ED PROVIDER: Gm Anderson PA-C      Chief Complaint   Patient presents with    Headache       FINAL IMPRESSION:  1. Acute non intractable tension-type headache    2. Dizziness        ED COURSE & MEDICAL DECISION MAKING:    Pertinent Labs & Imaging studies reviewed. (See chart for details)  3:48 PM I met the patient and performed my initial interview and exam.     40 year old male presents to the Emergency Department for evaluation of headache    ED Course as of 07/18/25 1608   Fri Jul 18, 2025   1604 Patient is a 40-year-old male Presents emergency department for evaluation of a headache that started yesterday.  Patient did take 1 dose of headache medicine, however reports it does not help.  He does have some nausea but no vomiting, does report some mild dizziness.  No vision changes.  On examination here, he has good strength and sensation to upper and lower extremities bilaterally, neurologically intact, able to ambulate without any difficulties.  No history of migraines, no history of head injuries.  Vitally stable here in the emergency department, no red flag neurologic signs suggestive of infection or neurologic problem.  Patient be given dose of Decadron, Toradol, as well as some meclizine here.  Will discharge following this.  Suspect likely tension headache with some mild component of dizziness which may be vertigo.  Patient sent with him additional meclizine, recommended to continue take the hide take medications and discharged with primary care follow-up.  Patient is agreeable with this plan.  Otherwise no indication for labs or imaging.  Will be discharged after medications.       Medical Decision Making  I reviewed the EMR: Outpatient Record: Patient ED visit on  "0/18/2025, outpatient ED visit on 02/27/2025  Discharge. I prescribed additional prescription strength medication(s) as charted. N/A.    MIPS (CTPE, Dental pain, Smith, Sinusitis, Asthma/COPD, Head Trauma): Not Applicable    SEPSIS: None        At the conclusion of the encounter I discussed the results of all of the tests and the disposition. The questions were answered. The patient or family acknowledged understanding and was agreeable with the care plan.     0 minutes of critical care time     MEDICATIONS GIVEN IN THE EMERGENCY:  Medications   ketorolac (TORADOL) injection 15 mg (has no administration in time range)   meclizine (ANTIVERT) tablet 25 mg (has no administration in time range)   dexAMETHasone (DECADRON) tablet 10 mg (has no administration in time range)       NEW PRESCRIPTIONS STARTED AT TODAY'S ER VISIT  New Prescriptions    MECLIZINE (ANTIVERT) 12.5 MG TABLET    Take 1 tablet (12.5 mg) by mouth 4 times daily as needed for dizziness.        =================================================================    HPI    Patient information was obtained from: patient via     Use of : Yes (Phone ) - Language Greek        Dima Atkinson is a 40 year old male with no seen medical history who presents to this ED for evaluation of a headache.     Patient presents with headaches that cause pain in the front of the head and the nape, along with some slight dizziness and numbness/tingling in the hands. Says they began about a month ago, and he has been experiencing them intermittently ever since. Taking tylenol for pain management. Has not had any recent fevers. Never had a migraine or hit his head that he remembers. Says he feels very tired sometimes, and \"doesn't want to do anything\" during those episodes. Believes these issues could be caused by him working long hours.     PAST MEDICAL HISTORY:  No past medical history on file.    PAST SURGICAL HISTORY:  No past surgical " history on file.    CURRENT MEDICATIONS:    meclizine (ANTIVERT) 12.5 MG tablet  famotidine (PEPCID) 20 MG tablet  hydrOXYzine (VISTARIL) 50 MG capsule         ALLERGIES:  No Known Allergies    FAMILY HISTORY:  No family history on file.    SOCIAL HISTORY:   Social History     Socioeconomic History    Marital status: Single   Tobacco Use    Smoking status: Never    Smokeless tobacco: Never       VITALS:  BP (!) 169/90   Pulse 81   Temp 97  F (36.1  C) (Oral)   Resp 20   Wt 91.1 kg (200 lb 14.4 oz)   SpO2 98%     PHYSICAL EXAM    Physical Exam  Vitals and nursing note reviewed.   Constitutional:       General: He is not in acute distress.     Appearance: Normal appearance. He is normal weight. He is not toxic-appearing or diaphoretic.   HENT:      Head: Normocephalic.      Right Ear: External ear normal.      Left Ear: External ear normal.   Cardiovascular:      Rate and Rhythm: Normal rate and regular rhythm.      Heart sounds: Normal heart sounds. No murmur heard.     No friction rub. No gallop.   Pulmonary:      Effort: Pulmonary effort is normal. No respiratory distress.      Breath sounds: No wheezing.   Abdominal:      General: Abdomen is flat. Bowel sounds are normal. There is no distension.      Palpations: Abdomen is soft.      Tenderness: There is no abdominal tenderness. There is no right CVA tenderness, left CVA tenderness, guarding or rebound.   Musculoskeletal:      Cervical back: No tenderness.      Right lower leg: No edema.      Left lower leg: No edema.   Skin:     General: Skin is warm.      Findings: No erythema or rash.   Neurological:      Mental Status: He is alert. Mental status is at baseline.      Motor: No weakness.           LAB:  All pertinent labs reviewed and interpreted.  Labs Ordered and Resulted from Time of ED Arrival to Time of ED Departure - No data to display    RADIOLOGY:  Reviewed all pertinent imaging. Please see official radiology report.  No orders to display      PROCEDURES:   None.     I, Roldan Estrada, am serving as a scribe to document services personally performed by Gm Anderson PA-C, based on my observation and the provider's statements to me. I, Gm Anderson PA-C, attest that Roldan Estrada is acting in a scribe capacity, has observed my performance of the services and has documented them in accordance with my direction.    Gm Anderson PA-C  Emergency Medicine  [facility] EMERGENCY DEPARTMENT  [address] [place] [id number]  [phone]  Dept: [phone]       Gm Anderson PA-C  07/18/25 1369

## 2025-07-18 NOTE — DISCHARGE INSTRUCTIONS
Seen here in the emergency department for evaluation of headache, continue to take ibuprofen or Tylenol, as well as the medication that I prescribed you.  Follow-up with a primary care doctor.    For pain or fever you may use:  -Tylenol 650 mg every 6 hours.  Max 4000 mg in 24 hours  Do not use thismedication with alcohol as it can cause liver problems.  -Ibuprofen 600 mg every 6 hours.  Max 3500 mg in 24 hours  Do not take this medication if you have a history of a GI bleed or have kidney problems.  You may use both of these medications at the same time or you can alternate them every 3 hours.  For example, Tylenol at 6 AM, ibuprofen at 9 AM, Tylenol at noon, etc.